# Patient Record
Sex: FEMALE | Race: WHITE | HISPANIC OR LATINO | URBAN - METROPOLITAN AREA
[De-identification: names, ages, dates, MRNs, and addresses within clinical notes are randomized per-mention and may not be internally consistent; named-entity substitution may affect disease eponyms.]

---

## 2022-01-05 ENCOUNTER — OFFICE VISIT (OUTPATIENT)
Dept: URBAN - METROPOLITAN AREA CLINIC 70 | Facility: CLINIC | Age: 57
End: 2022-01-05
Payer: COMMERCIAL

## 2022-01-05 DIAGNOSIS — U07.1 COVID-19: ICD-10-CM

## 2022-01-05 DIAGNOSIS — K51.918 ULCERATIVE COLITIS WITH OTHER COMPLICATION, UNSPECIFIED LOCATION: ICD-10-CM

## 2022-01-05 DIAGNOSIS — Z86.010 HISTORY OF COLON POLYPS: ICD-10-CM

## 2022-01-05 PROBLEM — 64766004: Status: ACTIVE | Noted: 2022-01-05

## 2022-01-05 PROBLEM — 428283002: Status: ACTIVE | Noted: 2022-01-05

## 2022-01-05 PROBLEM — 840539006: Status: ACTIVE | Noted: 2022-01-05

## 2022-01-05 PROCEDURE — 99442 PHONE E/M BY PHYS 11-20 MIN: CPT

## 2022-01-05 RX ORDER — CETIRIZINE HYDROCHLORIDE 10 MG/1
TAKE ONE TABLET BY MOUTH ONE TIME DAILY TABLET ORAL
Qty: 30 | Refills: 2 | Status: ACTIVE | COMMUNITY

## 2022-01-05 RX ORDER — METHIMAZOLE 10 MG/1
TAKE 1 TABLET (10 MG) BY ORAL ROUTE ONCE DAILY TABLET ORAL 1
Qty: 0 | Refills: 0 | Status: ON HOLD | COMMUNITY
Start: 1900-01-01

## 2022-01-05 RX ORDER — ALPRAZOLAM 1 MG/1
TAKE ONE TABLET BY MOUTH TWICE A DAY TABLET ORAL
Qty: 60 | Refills: 0 | Status: ACTIVE | COMMUNITY

## 2022-01-05 RX ORDER — MESALAMINE 1.2 G/1
TAKE 1 TABLET BY ORAL ROUTE 4 TIMES A DAY FOR 30 DAYS TABLET, DELAYED RELEASE ORAL
Qty: 120 | Refills: 6 | Status: ON HOLD | COMMUNITY
Start: 1900-01-01

## 2022-01-05 RX ORDER — HYDROCORTISONE ACETATE 25 MG/1
_INSERT ONE SUPPOSITORY RECTALLY TWICE A DAY SUPPOSITORY RECTAL
OUTPATIENT

## 2022-01-05 RX ORDER — HYDROCORTISONE 100 MG/60ML
_INSERT 60 MILLILITERS (100 MG) BY RECTAL ROUTE ONCE DAILY FOR 30 DAYS ENEMA RECTAL 1
Qty: 14 | Refills: 0 | Status: ON HOLD | COMMUNITY
Start: 2017-06-27

## 2022-01-05 RX ORDER — TEMAZEPAM 15 MG/1
TAKE 1 CAPSULE BY MOUTH AT BEDTIME AS NEEDED ONCE A DAY CAPSULE ORAL
Qty: 30 | Refills: 0 | Status: ACTIVE | COMMUNITY

## 2022-01-05 RX ORDER — MESALAMINE 1.2 G/1
TAKE 1 TABLET BY ORAL ROUTE 4 TIMES A DAY FOR 30 DAYS TABLET, DELAYED RELEASE ORAL
Qty: 120 | Refills: 0

## 2022-01-05 RX ORDER — HYDROCORTISONE ACETATE 25 MG/1
_INSERT ONE SUPPOSITORY RECTALLY TWICE A DAY SUPPOSITORY RECTAL
Qty: 10 | Refills: 0 | Status: ACTIVE | COMMUNITY

## 2022-01-05 RX ORDER — ALPRAZOLAM 0.25 MG
TAKE 1 TABLET (0.25 MG) BY ORAL ROUTE 3 TIMES PER DAY TABLET ORAL
Qty: 0 | Refills: 0 | Status: ON HOLD | COMMUNITY
Start: 1900-01-01

## 2022-01-05 RX ORDER — HYDROCORTISONE 100 MG/60ML
_INSERT 60 MILLILITERS (100 MG) BY RECTAL ROUTE ONCE DAILY FOR 30 DAYS ENEMA RECTAL 1
Qty: 30 | Refills: 0
Start: 2017-06-27 | End: 2022-02-04

## 2022-01-05 NOTE — HPI-TODAY'S VISIT:
Pt present for diarrhea after being diagnosed with COVID19 one week ago.  Pt has a hx of UC previously treated with Mesalamine 1.2 GM and has been in remission for the past 5 years w/o complications.  Pt reports on day 5 of COVID19 infection she experienced 5-6 episodes of abdominal pain and diarrhea which she states felt similar to hx of UC flares.  She also notes rectal irritation following the episodes of diarrhea.  Today, she states that diarrhea has resolved, but her rectum still feels "raw."  She states that Cortenema has previously managed her rectal irritation during flares.   Last colonoscopy was 9/25/2014 showing a 7 mm polyp in the sigmoid colon.  Recall for 5 years.  Pt declines repeat colonoscopy today due to hx of "sickness following miralax prep."  Discussed improtance of colonoscopies due to hx of polyps and UC.  Will discuss repeat colonoscopy at f/u.

## 2022-01-10 LAB
C-REACTIVE PROTEIN, QUANT: <1
CALPROTECTIN, FECAL: 67
SEDIMENTATION RATE-WESTERGREN: 3

## 2022-04-18 ENCOUNTER — TELEPHONE ENCOUNTER (OUTPATIENT)
Dept: URBAN - METROPOLITAN AREA CLINIC 70 | Facility: CLINIC | Age: 57
End: 2022-04-18

## 2022-04-18 RX ORDER — MESALAMINE 1.2 G/1
TAKE 1 TABLET BY ORAL ROUTE 4 TIMES A DAY FOR 30 DAYS TABLET, DELAYED RELEASE ORAL
Qty: 120 | Refills: 3

## 2023-08-14 ENCOUNTER — TELEPHONE ENCOUNTER (OUTPATIENT)
Dept: URBAN - METROPOLITAN AREA CLINIC 70 | Facility: CLINIC | Age: 58
End: 2023-08-14

## 2023-08-23 ENCOUNTER — OFFICE VISIT (OUTPATIENT)
Dept: URBAN - METROPOLITAN AREA CLINIC 70 | Facility: CLINIC | Age: 58
End: 2023-08-23
Payer: COMMERCIAL

## 2023-08-23 ENCOUNTER — WEB ENCOUNTER (OUTPATIENT)
Dept: URBAN - METROPOLITAN AREA CLINIC 70 | Facility: CLINIC | Age: 58
End: 2023-08-23

## 2023-08-23 VITALS
BODY MASS INDEX: 18.99 KG/M2 | WEIGHT: 103.2 LBS | HEART RATE: 56 BPM | SYSTOLIC BLOOD PRESSURE: 99 MMHG | DIASTOLIC BLOOD PRESSURE: 62 MMHG | HEIGHT: 62 IN

## 2023-08-23 DIAGNOSIS — K51.918 ULCERATIVE COLITIS WITH OTHER COMPLICATION, UNSPECIFIED LOCATION: ICD-10-CM

## 2023-08-23 DIAGNOSIS — Z86.010 HISTORY OF COLON POLYPS: ICD-10-CM

## 2023-08-23 DIAGNOSIS — K62.89 PROCTITIS: ICD-10-CM

## 2023-08-23 PROCEDURE — 99204 OFFICE O/P NEW MOD 45 MIN: CPT

## 2023-08-23 RX ORDER — MESALAMINE 1.2 G/1
TAKE 1 TABLET BY ORAL ROUTE 4 TIMES A DAY FOR 30 DAYS TABLET, DELAYED RELEASE ORAL
Qty: 120 | Refills: 3 | Status: ACTIVE | COMMUNITY

## 2023-08-23 RX ORDER — TEMAZEPAM 15 MG/1
TAKE 1 CAPSULE BY MOUTH AT BEDTIME AS NEEDED ONCE A DAY CAPSULE ORAL
Qty: 30 | Refills: 0 | Status: ACTIVE | COMMUNITY

## 2023-08-23 RX ORDER — ALPRAZOLAM 1 MG/1
TAKE ONE TABLET BY MOUTH TWICE A DAY TABLET ORAL
Qty: 60 | Refills: 0 | Status: ACTIVE | COMMUNITY

## 2023-08-23 RX ORDER — MESALAMINE 1.2 G/1
TAKE 1 TABLET BY ORAL ROUTE 3 TIMES A DAY FOR 30 DAYS TABLET, DELAYED RELEASE ORAL THREE TIMES A DAY
Qty: 270 | Refills: 3

## 2023-08-23 RX ORDER — ALPRAZOLAM 0.25 MG
TAKE 1 TABLET (0.25 MG) BY ORAL ROUTE 3 TIMES PER DAY TABLET ORAL
Qty: 0 | Refills: 0 | Status: ACTIVE | COMMUNITY
Start: 1900-01-01

## 2023-08-23 RX ORDER — CETIRIZINE HYDROCHLORIDE 10 MG/1
TAKE ONE TABLET BY MOUTH ONE TIME DAILY TABLET ORAL
Qty: 30 | Refills: 2 | Status: ACTIVE | COMMUNITY

## 2023-08-23 RX ORDER — METHIMAZOLE 10 MG/1
TAKE 1 TABLET (10 MG) BY ORAL ROUTE ONCE DAILY TABLET ORAL 1
Qty: 0 | Refills: 0 | Status: DISCONTINUED | COMMUNITY
Start: 1900-01-01

## 2023-08-23 RX ORDER — HYDROCORTISONE 100 MG/60ML
60 ML IN THE EVENING ENEMA RECTAL ONCE A DAY
Qty: 600 ML | Refills: 0 | OUTPATIENT
Start: 2023-08-23 | End: 2023-09-02

## 2023-08-23 NOTE — HPI-TODAY'S VISIT:
The pt presents for a f/u for UC.  She is currently taking Mesalamine 3 times a day and states symptoms are well controlled. She is requesting a refill for her medication.  She admits to occasional constipation.  Miralax improves symptoms. She denies recent labs or procedures. She continues to decline a colonoscopy and gets very anxious and tearful when the procedure was discussed/recommended.

## 2023-08-23 NOTE — HPI-OTHER HISTORIES
OV 1/5/2022: Pt present for diarrhea after being diagnosed with COVID19 one week ago.  Pt has a hx of UC previously treated with Mesalamine 1.2 GM and has been in remission for the past 5 years w/o complications.  Pt reports on day 5 of COVID19 infection she experienced 5-6 episodes of abdominal pain and diarrhea which she states felt similar to hx of UC flares.  She also notes rectal irritation following the episodes of diarrhea.  Today, she states that diarrhea has resolved, but her rectum still feels "raw."  She states that Cortenema has previously managed her rectal irritation during flares.   Last colonoscopy was 9/25/2014 showing a 7 mm polyp in the sigmoid colon.  Recall for 5 years.  Pt declines repeat colonoscopy today due to hx of "sickness following miralax prep."  Discussed improtance of colonoscopies due to hx of polyps and UC.  Will discuss repeat colonoscopy at f/u.

## 2023-08-29 LAB
C-REACTIVE PROTEIN, QUANT: 0.4
SED RATE BY MODIFIED: 2

## 2023-08-31 LAB — CALPROTECTIN, FECAL: 35

## 2023-09-21 ENCOUNTER — TELEPHONE ENCOUNTER (OUTPATIENT)
Dept: URBAN - METROPOLITAN AREA CLINIC 70 | Facility: CLINIC | Age: 58
End: 2023-09-21

## 2023-10-04 ENCOUNTER — OFFICE VISIT (OUTPATIENT)
Dept: URBAN - METROPOLITAN AREA CLINIC 70 | Facility: CLINIC | Age: 58
End: 2023-10-04

## 2023-10-06 ENCOUNTER — DASHBOARD ENCOUNTERS (OUTPATIENT)
Age: 58
End: 2023-10-06

## 2023-10-06 ENCOUNTER — OFFICE VISIT (OUTPATIENT)
Dept: URBAN - METROPOLITAN AREA CLINIC 70 | Facility: CLINIC | Age: 58
End: 2023-10-06
Payer: COMMERCIAL

## 2023-10-06 ENCOUNTER — WEB ENCOUNTER (OUTPATIENT)
Dept: URBAN - METROPOLITAN AREA CLINIC 70 | Facility: CLINIC | Age: 58
End: 2023-10-06

## 2023-10-06 VITALS
HEIGHT: 62 IN | WEIGHT: 101.4 LBS | TEMPERATURE: 98.1 F | DIASTOLIC BLOOD PRESSURE: 79 MMHG | HEART RATE: 68 BPM | SYSTOLIC BLOOD PRESSURE: 115 MMHG | BODY MASS INDEX: 18.66 KG/M2

## 2023-10-06 DIAGNOSIS — K51.918 ULCERATIVE COLITIS WITH OTHER COMPLICATION, UNSPECIFIED LOCATION: ICD-10-CM

## 2023-10-06 PROCEDURE — 99214 OFFICE O/P EST MOD 30 MIN: CPT

## 2023-10-06 RX ORDER — TEMAZEPAM 15 MG/1
TAKE 1 CAPSULE BY MOUTH AT BEDTIME AS NEEDED ONCE A DAY CAPSULE ORAL
Qty: 30 | Refills: 0 | Status: ACTIVE | COMMUNITY

## 2023-10-06 RX ORDER — ALPRAZOLAM 0.25 MG
TAKE 1 TABLET (0.25 MG) BY ORAL ROUTE 3 TIMES PER DAY TABLET ORAL
Qty: 0 | Refills: 0 | Status: ACTIVE | COMMUNITY
Start: 1900-01-01

## 2023-10-06 RX ORDER — MESALAMINE 1.2 G/1
TAKE 1 TABLET BY ORAL ROUTE 3 TIMES A DAY FOR 30 DAYS TABLET, DELAYED RELEASE ORAL THREE TIMES A DAY
OUTPATIENT

## 2023-10-06 RX ORDER — MESALAMINE 1.2 G/1
TAKE 1 TABLET BY ORAL ROUTE 3 TIMES A DAY FOR 30 DAYS TABLET, DELAYED RELEASE ORAL THREE TIMES A DAY
Qty: 270 | Refills: 3 | Status: ACTIVE | COMMUNITY

## 2023-10-06 RX ORDER — CETIRIZINE HYDROCHLORIDE 10 MG/1
TAKE ONE TABLET BY MOUTH ONE TIME DAILY TABLET ORAL
Qty: 30 | Refills: 2 | Status: ACTIVE | COMMUNITY

## 2023-10-06 RX ORDER — ALPRAZOLAM 1 MG/1
TAKE ONE TABLET BY MOUTH TWICE A DAY TABLET ORAL
Qty: 60 | Refills: 0 | Status: ACTIVE | COMMUNITY

## 2023-10-06 NOTE — PHYSICAL EXAM RECTAL:
normal tone, nonthrombosed. external hemorrhoids present, no masses palpable, no melena, no red blood, no rectal irritation., Chaperone Present: RUBY Cid MA.

## 2023-10-06 NOTE — HPI-OTHER HISTORIES
OV 8/23/2023: The pt presents for a f/u for UC.  She is currently taking Mesalamine 3 times a day and states symptoms are well controlled. She is requesting a refill for her medication.  She admits to occasional constipation.  Miralax improves symptoms. She denies recent labs or procedures. She continues to decline a colonoscopy and gets very anxious and tearful when the procedure was discussed/recommended. ----------------------------- OV 1/5/2022: Pt present for diarrhea after being diagnosed with COVID19 one week ago.  Pt has a hx of UC previously treated with Mesalamine 1.2 GM and has been in remission for the past 5 years w/o complications.  Pt reports on day 5 of COVID19 infection she experienced 5-6 episodes of abdominal pain and diarrhea which she states felt similar to hx of UC flares.  She also notes rectal irritation following the episodes of diarrhea.  Today, she states that diarrhea has resolved, but her rectum still feels "raw."  She states that Cortenema has previously managed her rectal irritation during flares.   Last colonoscopy was 9/25/2014 showing a 7 mm polyp in the sigmoid colon.  Recall for 5 years.  Pt declines repeat colonoscopy today due to hx of "sickness following miralax prep."  Discussed improtance of colonoscopies due to hx of polyps and UC.  Will discuss repeat colonoscopy at f/u.

## 2023-10-06 NOTE — HPI-TODAY'S VISIT:
The pt presents for a f/u for UC.  She states  that at her last appt "she lied and said she was taking her medication when she had not taken it for the last year."  Diarrhea and rectal pain developed a few weeks ago. She restarted Mesalamine 8 days ago and diarrhea is improving.  She states she was previously using hydrocortisone enemas daily and developed rectal rawness.  She stopped the suppositories and her rectal irritation improved.  Exam today revealed no rectal irritaiton.  Non thrombosed external hemorrhoids or BRB are present. Minimal pain voiced with exam.

## 2023-11-13 ENCOUNTER — OFFICE VISIT (OUTPATIENT)
Dept: URBAN - METROPOLITAN AREA CLINIC 70 | Facility: CLINIC | Age: 58
End: 2023-11-13

## 2023-12-27 ENCOUNTER — TELEPHONE ENCOUNTER (OUTPATIENT)
Dept: URBAN - METROPOLITAN AREA CLINIC 70 | Facility: CLINIC | Age: 58
End: 2023-12-27

## 2023-12-27 RX ORDER — FAMOTIDINE 40 MG/1
1 TABLET AT BEDTIME TABLET, FILM COATED ORAL ONCE A DAY
Qty: 60 TABLET | Refills: 0 | OUTPATIENT
Start: 2023-12-28

## 2024-09-10 ENCOUNTER — OFFICE VISIT (OUTPATIENT)
Dept: URBAN - METROPOLITAN AREA CLINIC 70 | Facility: CLINIC | Age: 59
End: 2024-09-10
Payer: COMMERCIAL

## 2024-09-10 VITALS
TEMPERATURE: 98.1 F | HEART RATE: 72 BPM | HEIGHT: 62 IN | DIASTOLIC BLOOD PRESSURE: 77 MMHG | WEIGHT: 98.2 LBS | BODY MASS INDEX: 18.07 KG/M2 | SYSTOLIC BLOOD PRESSURE: 116 MMHG

## 2024-09-10 DIAGNOSIS — K59.09 CHANGE IN BOWEL MOVEMENTS INTERMITTENT CONSTIPATION. URGENCY IN THE MORNING.: ICD-10-CM

## 2024-09-10 DIAGNOSIS — K51.918 ULCERATIVE COLITIS WITH OTHER COMPLICATION, UNSPECIFIED LOCATION: ICD-10-CM

## 2024-09-10 DIAGNOSIS — Z80.0 FAMILY HISTORY OF COLON CANCER IN FATHER: ICD-10-CM

## 2024-09-10 DIAGNOSIS — K62.5 RECTAL BLEEDING: ICD-10-CM

## 2024-09-10 PROBLEM — 14760008: Status: ACTIVE | Noted: 2024-09-10

## 2024-09-10 PROBLEM — 312824007: Status: ACTIVE | Noted: 2024-09-10

## 2024-09-10 PROCEDURE — 99214 OFFICE O/P EST MOD 30 MIN: CPT | Performed by: INTERNAL MEDICINE

## 2024-09-10 RX ORDER — FAMOTIDINE 40 MG/1
1 TABLET AT BEDTIME TABLET, FILM COATED ORAL ONCE A DAY
Qty: 60 TABLET | Refills: 0 | Status: ACTIVE | COMMUNITY
Start: 2023-12-28

## 2024-09-10 RX ORDER — MESALAMINE 1.2 G/1
4 TABLETS WITH MEAL TABLET, DELAYED RELEASE ORAL ONCE A DAY
Qty: 360 TABLET | Refills: 3 | OUTPATIENT
Start: 2024-09-10 | End: 2025-09-05

## 2024-09-10 RX ORDER — TEMAZEPAM 15 MG/1
TAKE 1 CAPSULE BY MOUTH AT BEDTIME AS NEEDED ONCE A DAY CAPSULE ORAL
Qty: 30 | Refills: 0 | Status: ACTIVE | COMMUNITY

## 2024-09-10 RX ORDER — CETIRIZINE HYDROCHLORIDE 10 MG/1
TAKE ONE TABLET BY MOUTH ONE TIME DAILY TABLET ORAL
Qty: 30 | Refills: 2 | Status: ACTIVE | COMMUNITY

## 2024-09-10 RX ORDER — ALPRAZOLAM 1 MG/1
TAKE ONE TABLET BY MOUTH TWICE A DAY TABLET ORAL
Qty: 60 | Refills: 0 | Status: ACTIVE | COMMUNITY

## 2024-09-10 RX ORDER — ALPRAZOLAM 0.25 MG
TAKE 1 TABLET (0.25 MG) BY ORAL ROUTE 3 TIMES PER DAY TABLET ORAL
Qty: 0 | Refills: 0 | Status: DISCONTINUED | COMMUNITY
Start: 1900-01-01

## 2024-09-10 RX ORDER — MESALAMINE 1.2 G/1
TAKE 1 TABLET BY ORAL ROUTE 3 TIMES A DAY FOR 30 DAYS TABLET, DELAYED RELEASE ORAL THREE TIMES A DAY
Status: ACTIVE | COMMUNITY

## 2024-09-10 NOTE — HPI-OTHER HISTORIES
OV 10/16/2023 The pt presents for a f/u for UC. She states that at her last appt "she lied and said she was taking her medication when she had not taken it for the last year." Diarrhea and rectal pain developed a few weeks ago. She restarted Mesalamine 8 days ago and diarrhea is improving. She states she was previously using hydrocortisone enemas daily and developed rectal rawness. She stopped the suppositories and her rectal irritation improved. Exam today revealed no rectal irritaiton. Non thrombosed external hemorrhoids or BRB are present. Minimal pain voiced with exam. - - - - - - - - - - - - - - - - - - - - - - - - - - - - - - - - - - - - - - - - - - - - - - - - - - - - - - - - - - - - - - - OV 8/23/2023: The pt presents for a f/u for UC.  She is currently taking Mesalamine 3 times a day and states symptoms are well controlled. She is requesting a refill for her medication.  She admits to occasional constipation.  Miralax improves symptoms. She denies recent labs or procedures. She continues to decline a colonoscopy and gets very anxious and tearful when the procedure was discussed/recommended. - - - - - - - - - - - - - - - OV 1/5/2022: Pt present for diarrhea after being diagnosed with COVID19 one week ago.  Pt has a hx of UC previously treated with Mesalamine 1.2 GM and has been in remission for the past 5 years w/o complications.  Pt reports on day 5 of COVID19 infection she experienced 5-6 episodes of abdominal pain and diarrhea which she states felt similar to hx of UC flares.  She also notes rectal irritation following the episodes of diarrhea.  Today, she states that diarrhea has resolved, but her rectum still feels "raw."  She states that Cortenema has previously managed her rectal irritation during flares.   Last colonoscopy was 9/25/2014 showing a 7 mm polyp in the sigmoid colon.  Recall for 5 years.  Pt declines repeat colonoscopy today due to hx of "sickness following miralax prep."  Discussed improtance of colonoscopies due to hx of polyps and UC.  Will discuss repeat colonoscopy at f/u. - - - - - - - - - - - - - - - - - - - - - - - - - - - - - - - - - - - - - - - - - - - - - - - - - - - - - - - - - - - - - - - - - - - - - - - - -

## 2024-09-10 NOTE — HPI-TODAY'S VISIT:
Here for UC follow up. Patient is poor historian. She is very anxious in clinic. C/o abdominal gas, intermittent rectal bleeding and hard stools intermittently. Patient attributed rectal bleeding to her hemorrhoids. Denies having any nausea, vomiting, abdominal pain, melena, weight loss, lack of appetite. Reports fluctuations in her weight but denies substantial weight loss. Last colonoscopy was in 2014. Patient does not wish to have repeat colonoscopy. Previous endoscopic data reviewed.  Colonscopy 2014 A diffuse area of moderately erythematous, eroded, friable (with contact bleeding) and granular mucosa was found in the entire colon. Biopsies were taken with a cold forceps for histology. Estimated blood loss was minimal. A sessile polyp was found in the sigmoid colon. The polyp was 7 mm in size. The polyp was removed with a cold biopsy forceps. Resection and retrieval were complete. Estimated blood loss was minimal. The exam was otherwise without abnormality on direct and retroflexion views. endoscopic appearance is cw ulcerative colitis ileocecal valve is normal otherwise stool obtained for c and s and c diff

## 2024-09-11 LAB
A/G RATIO: 1.6
ALBUMIN: 4.4
ALKALINE PHOSPHATASE: 70
ALT (SGPT): 12
AST (SGOT): 20
BILIRUBIN, TOTAL: 0.4
BUN/CREATININE RATIO: (no result)
BUN: 9
CALCIUM: 9.4
CARBON DIOXIDE, TOTAL: 26
CHLORIDE: 105
CREATININE: 0.66
EGFR: 100
GLOBULIN, TOTAL: 2.8
GLUCOSE: 84
HEMATOCRIT: 40.6
HEMOGLOBIN: 13.5
MCH: 32.4
MCHC: 33.3
MCV: 97.4
MPV: 10
PLATELET COUNT: 301
POTASSIUM: 4.1
PROTEIN, TOTAL: 7.2
RDW: 12.2
RED BLOOD CELL COUNT: 4.17
SODIUM: 140
WHITE BLOOD CELL COUNT: 6.6

## 2024-09-18 LAB — CALPROTECTIN, FECAL: 497

## 2024-09-20 ENCOUNTER — TELEPHONE ENCOUNTER (OUTPATIENT)
Dept: URBAN - METROPOLITAN AREA CLINIC 70 | Facility: CLINIC | Age: 59
End: 2024-09-20